# Patient Record
Sex: MALE | Race: WHITE | Employment: OTHER | URBAN - METROPOLITAN AREA
[De-identification: names, ages, dates, MRNs, and addresses within clinical notes are randomized per-mention and may not be internally consistent; named-entity substitution may affect disease eponyms.]

---

## 2017-03-14 PROBLEM — R73.9 HYPERGLYCEMIA: Status: ACTIVE | Noted: 2017-03-14

## 2018-04-04 PROCEDURE — 86803 HEPATITIS C AB TEST: CPT | Performed by: INTERNAL MEDICINE

## 2018-04-04 PROCEDURE — 82746 ASSAY OF FOLIC ACID SERUM: CPT | Performed by: INTERNAL MEDICINE

## 2018-04-04 PROCEDURE — 82607 VITAMIN B-12: CPT | Performed by: INTERNAL MEDICINE

## 2018-05-02 PROCEDURE — 87186 SC STD MICRODIL/AGAR DIL: CPT | Performed by: INTERNAL MEDICINE

## 2018-05-02 PROCEDURE — 87070 CULTURE OTHR SPECIMN AEROBIC: CPT | Performed by: INTERNAL MEDICINE

## 2018-05-02 PROCEDURE — 87077 CULTURE AEROBIC IDENTIFY: CPT | Performed by: INTERNAL MEDICINE

## 2018-05-02 PROCEDURE — 87205 SMEAR GRAM STAIN: CPT | Performed by: INTERNAL MEDICINE

## 2018-05-24 PROCEDURE — 82607 VITAMIN B-12: CPT | Performed by: INTERNAL MEDICINE

## 2019-03-14 PROBLEM — N18.30 CKD (CHRONIC KIDNEY DISEASE) STAGE 3, GFR 30-59 ML/MIN (HCC): Status: ACTIVE | Noted: 2019-03-14

## 2019-11-07 PROBLEM — N18.30 CKD (CHRONIC KIDNEY DISEASE) STAGE 3, GFR 30-59 ML/MIN (HCC): Status: RESOLVED | Noted: 2019-03-14 | Resolved: 2019-11-07

## 2020-12-03 PROBLEM — N18.30 CKD (CHRONIC KIDNEY DISEASE) STAGE 3, GFR 30-59 ML/MIN (HCC): Status: RESOLVED | Noted: 2019-03-14 | Resolved: 2020-12-03

## 2020-12-03 PROBLEM — N18.31 STAGE 3A CHRONIC KIDNEY DISEASE (HCC): Status: ACTIVE | Noted: 2020-12-03

## 2024-08-28 NOTE — H&P (VIEW-ONLY)
Dennis Olivo is a 74 year old   male    Patient presents for complaints of pain and a bulge in the right inguinal region.  This has gotten gradually larger    Past Medical History:   Diagnosis Date   • BPH (benign prostatic hyperplasia)    • CAD (coronary artery disease) 2002   • Essential hypertension    • Hyperlipidemia    • Hypothyroidism (acquired)    • ILD (interstitial lung disease) (HCC)      Past Surgical History:   Procedure Laterality Date   • CATARACT     • OTHER SURGICAL HISTORY  1994    post anal abscess   • REMV CATARACT EXTRACAP,INSERT LENS  12/7/2011    Performed by LISA MCKEON at [facility]   • REMV CATARACT EXTRACAP,INSERT LENS  12/21/2011    Performed by LISA MCKEON at [facility]     Family History   Problem Relation Age of Onset   • Heart Disorder Father    • Heart Disorder Mother    • Lipids Sister    • Lipids Brother    • Lipids Brother    • Lipids Brother    • Lipids Brother    • Lipids Brother    • Lipids Sister    • Lipids Sister    • Lipids Sister      Social History    Tobacco Use      Smoking status: Never      Smokeless tobacco: Never    Vaping Use      Vaping status: Never Used    Alcohol use: Not Currently      Alcohol/week: 0.0 standard drinks of alcohol      Comment: rare    Drug use: No        ROS:  GENERAL HEALTH: otherwise feels well, no weight loss, no fever or chills  SKIN: denies any unusual skin rashes or jaundice  HEENT: denies nasal congestion, sinus pain or sore throat; hearing loss negative  RESPIRATORY: denies shortness of breath, wheezing or cough   CARDIOVASCULAR: denies chest pain or CANTOR; no palpitations   GI: denies nausea, vomiting, constipation, diarrhea; no rectal bleeding; no heartburn  GENITAL/: no dysuria, urgency or frequency, no tea colored urine  MUSCULOSKELETAL: no joint complaints upper or lower extremities  HEMATOLOGY: denies hx anemia; denies bruising or excessive bleeding    EXAM:  GENERAL: well developed, well  nourished male, in no apparent distress  SKIN: anicteric  EYES: PERRLA, EOMI, sclera anicteric  HEENT: normocephalic; normal nose  NECK: supple, no JVD  RESPIRATORY: clear to percussion and auscultation  CARDIOVASCULAR: RRR, murmur negative  ABDOMEN: normal active BS, soft, nondistended; no HSM, no masses. There is a reducible hernia present in the right  inguinal region. The testes are descended.  LYMPHATIC: no lymphadenopathy  EXTREMITIES: no cyanosis, clubbing or edema          Imp:  right inguinal hernia    Rec:  Robotic assisted right inguinal hernia repair with mesh    The risks, benefits, and alternatives were discussed with the patient at length.   We discussed the expectations after surgery including activity restrictions, weight limitations. All of the patients questions were answered    The patient was given the booklet about hernia surgery      Thanks for referring the patient.

## 2024-09-09 RX ORDER — HYDROCODONE BITARTRATE AND HOMATROPINE METHYLBROMIDE 5; 1.5 MG/1; MG/1
1 TABLET ORAL 4 TIMES DAILY PRN
COMMUNITY

## 2024-09-09 RX ORDER — NINTEDANIB 150 MG/1
1 CAPSULE ORAL DAILY
COMMUNITY

## 2024-09-09 RX ORDER — HEPARIN SODIUM 5000 [USP'U]/ML
5000 INJECTION, SOLUTION INTRAVENOUS; SUBCUTANEOUS ONCE
Status: CANCELLED | OUTPATIENT
Start: 2024-09-09 | End: 2024-09-09

## 2024-09-09 RX ORDER — MULTIVIT-MIN/IRON FUM/FOLIC AC 7.5 MG-4
1 TABLET ORAL DAILY
COMMUNITY

## 2024-09-23 ENCOUNTER — ANESTHESIA EVENT (OUTPATIENT)
Dept: SURGERY | Facility: HOSPITAL | Age: 74
End: 2024-09-23
Payer: MEDICARE

## 2024-09-24 ENCOUNTER — ANESTHESIA (OUTPATIENT)
Dept: SURGERY | Facility: HOSPITAL | Age: 74
End: 2024-09-24
Payer: MEDICARE

## 2024-09-24 ENCOUNTER — HOSPITAL ENCOUNTER (OUTPATIENT)
Facility: HOSPITAL | Age: 74
Setting detail: HOSPITAL OUTPATIENT SURGERY
Discharge: HOME | End: 2024-09-24
Attending: SURGERY | Admitting: SURGERY
Payer: MEDICARE

## 2024-09-24 VITALS
SYSTOLIC BLOOD PRESSURE: 126 MMHG | DIASTOLIC BLOOD PRESSURE: 64 MMHG | RESPIRATION RATE: 20 BRPM | HEART RATE: 72 BPM | BODY MASS INDEX: 23.7 KG/M2 | WEIGHT: 160 LBS | HEIGHT: 69 IN | TEMPERATURE: 97 F | OXYGEN SATURATION: 94 %

## 2024-09-24 DIAGNOSIS — K40.90 RIGHT INGUINAL HERNIA: Primary | ICD-10-CM

## 2024-09-24 PROCEDURE — 8E0W4CZ ROBOTIC ASSISTED PROCEDURE OF TRUNK REGION, PERCUTANEOUS ENDOSCOPIC APPROACH: ICD-10-PCS | Performed by: SURGERY

## 2024-09-24 PROCEDURE — 0YU54JZ SUPPLEMENT RIGHT INGUINAL REGION WITH SYNTHETIC SUBSTITUTE, PERCUTANEOUS ENDOSCOPIC APPROACH: ICD-10-PCS | Performed by: SURGERY

## 2024-09-24 DEVICE — LAPAROSCOPIC SELF-FIXATING MESH POLYESTER WITH POLYLACTIC ACID GRIPS AND COLLAGEN FILM
Type: IMPLANTABLE DEVICE | Site: GROIN | Status: FUNCTIONAL
Brand: PROGRIP

## 2024-09-24 RX ORDER — NALOXONE HYDROCHLORIDE 0.4 MG/ML
0.08 INJECTION, SOLUTION INTRAMUSCULAR; INTRAVENOUS; SUBCUTANEOUS AS NEEDED
Status: DISCONTINUED | OUTPATIENT
Start: 2024-09-24 | End: 2024-09-24

## 2024-09-24 RX ORDER — KETOROLAC TROMETHAMINE 30 MG/ML
INJECTION, SOLUTION INTRAMUSCULAR; INTRAVENOUS AS NEEDED
Status: DISCONTINUED | OUTPATIENT
Start: 2024-09-24 | End: 2024-09-24 | Stop reason: SURG

## 2024-09-24 RX ORDER — LIDOCAINE HYDROCHLORIDE 10 MG/ML
INJECTION, SOLUTION EPIDURAL; INFILTRATION; INTRACAUDAL; PERINEURAL AS NEEDED
Status: DISCONTINUED | OUTPATIENT
Start: 2024-09-24 | End: 2024-09-24 | Stop reason: SURG

## 2024-09-24 RX ORDER — HYDROCODONE BITARTRATE AND ACETAMINOPHEN 5; 325 MG/1; MG/1
1 TABLET ORAL EVERY 4 HOURS PRN
Qty: 20 TABLET | Refills: 0 | Status: SHIPPED | OUTPATIENT
Start: 2024-09-24

## 2024-09-24 RX ORDER — ACETAMINOPHEN 500 MG
1000 TABLET ORAL ONCE
Status: DISCONTINUED | OUTPATIENT
Start: 2024-09-24 | End: 2024-09-24 | Stop reason: HOSPADM

## 2024-09-24 RX ORDER — SODIUM CHLORIDE, SODIUM LACTATE, POTASSIUM CHLORIDE, CALCIUM CHLORIDE 600; 310; 30; 20 MG/100ML; MG/100ML; MG/100ML; MG/100ML
INJECTION, SOLUTION INTRAVENOUS CONTINUOUS
Status: DISCONTINUED | OUTPATIENT
Start: 2024-09-24 | End: 2024-09-24

## 2024-09-24 RX ORDER — LIDOCAINE HYDROCHLORIDE ANHYDROUS AND DEXTROSE MONOHYDRATE .8; 5 G/100ML; G/100ML
INJECTION, SOLUTION INTRAVENOUS CONTINUOUS PRN
Status: DISCONTINUED | OUTPATIENT
Start: 2024-09-24 | End: 2024-09-24 | Stop reason: SURG

## 2024-09-24 RX ORDER — METOCLOPRAMIDE HYDROCHLORIDE 5 MG/ML
10 INJECTION INTRAMUSCULAR; INTRAVENOUS EVERY 8 HOURS PRN
Status: DISCONTINUED | OUTPATIENT
Start: 2024-09-24 | End: 2024-09-24

## 2024-09-24 RX ORDER — LIDOCAINE HYDROCHLORIDE AND EPINEPHRINE 10; 10 MG/ML; UG/ML
INJECTION, SOLUTION INFILTRATION; PERINEURAL AS NEEDED
Status: DISCONTINUED | OUTPATIENT
Start: 2024-09-24 | End: 2024-09-24 | Stop reason: HOSPADM

## 2024-09-24 RX ORDER — LIDOCAINE HYDROCHLORIDE 40 MG/ML
SOLUTION TOPICAL AS NEEDED
Status: DISCONTINUED | OUTPATIENT
Start: 2024-09-24 | End: 2024-09-24 | Stop reason: SURG

## 2024-09-24 RX ORDER — ONDANSETRON 2 MG/ML
4 INJECTION INTRAMUSCULAR; INTRAVENOUS EVERY 6 HOURS PRN
Status: DISCONTINUED | OUTPATIENT
Start: 2024-09-24 | End: 2024-09-24

## 2024-09-24 RX ORDER — PHENYLEPHRINE HCL 10 MG/ML
VIAL (ML) INJECTION AS NEEDED
Status: DISCONTINUED | OUTPATIENT
Start: 2024-09-24 | End: 2024-09-24 | Stop reason: SURG

## 2024-09-24 RX ORDER — ONDANSETRON 2 MG/ML
INJECTION INTRAMUSCULAR; INTRAVENOUS AS NEEDED
Status: DISCONTINUED | OUTPATIENT
Start: 2024-09-24 | End: 2024-09-24 | Stop reason: SURG

## 2024-09-24 RX ORDER — HYDROMORPHONE HYDROCHLORIDE 1 MG/ML
0.2 INJECTION, SOLUTION INTRAMUSCULAR; INTRAVENOUS; SUBCUTANEOUS EVERY 5 MIN PRN
Status: DISCONTINUED | OUTPATIENT
Start: 2024-09-24 | End: 2024-09-24

## 2024-09-24 RX ORDER — HYDROMORPHONE HYDROCHLORIDE 1 MG/ML
0.6 INJECTION, SOLUTION INTRAMUSCULAR; INTRAVENOUS; SUBCUTANEOUS EVERY 5 MIN PRN
Status: DISCONTINUED | OUTPATIENT
Start: 2024-09-24 | End: 2024-09-24

## 2024-09-24 RX ORDER — ACETAMINOPHEN 500 MG
1000 TABLET ORAL ONCE AS NEEDED
Status: DISCONTINUED | OUTPATIENT
Start: 2024-09-24 | End: 2024-09-24

## 2024-09-24 RX ORDER — HYDROCODONE BITARTRATE AND ACETAMINOPHEN 5; 325 MG/1; MG/1
1 TABLET ORAL ONCE AS NEEDED
Status: DISCONTINUED | OUTPATIENT
Start: 2024-09-24 | End: 2024-09-24

## 2024-09-24 RX ORDER — HYDROMORPHONE HYDROCHLORIDE 1 MG/ML
0.4 INJECTION, SOLUTION INTRAMUSCULAR; INTRAVENOUS; SUBCUTANEOUS EVERY 5 MIN PRN
Status: DISCONTINUED | OUTPATIENT
Start: 2024-09-24 | End: 2024-09-24

## 2024-09-24 RX ORDER — BUPIVACAINE HYDROCHLORIDE 5 MG/ML
INJECTION, SOLUTION EPIDURAL; INTRACAUDAL AS NEEDED
Status: DISCONTINUED | OUTPATIENT
Start: 2024-09-24 | End: 2024-09-24 | Stop reason: HOSPADM

## 2024-09-24 RX ORDER — DEXAMETHASONE SODIUM PHOSPHATE 4 MG/ML
VIAL (ML) INJECTION AS NEEDED
Status: DISCONTINUED | OUTPATIENT
Start: 2024-09-24 | End: 2024-09-24 | Stop reason: SURG

## 2024-09-24 RX ORDER — EPHEDRINE SULFATE 50 MG/ML
INJECTION INTRAVENOUS AS NEEDED
Status: DISCONTINUED | OUTPATIENT
Start: 2024-09-24 | End: 2024-09-24 | Stop reason: SURG

## 2024-09-24 RX ORDER — HYDROCODONE BITARTRATE AND ACETAMINOPHEN 5; 325 MG/1; MG/1
2 TABLET ORAL ONCE AS NEEDED
Status: DISCONTINUED | OUTPATIENT
Start: 2024-09-24 | End: 2024-09-24

## 2024-09-24 RX ORDER — ROCURONIUM BROMIDE 10 MG/ML
INJECTION, SOLUTION INTRAVENOUS AS NEEDED
Status: DISCONTINUED | OUTPATIENT
Start: 2024-09-24 | End: 2024-09-24 | Stop reason: SURG

## 2024-09-24 RX ADMIN — EPHEDRINE SULFATE 5 MG: 50 INJECTION INTRAVENOUS at 07:54:00

## 2024-09-24 RX ADMIN — KETOROLAC TROMETHAMINE 15 MG: 30 INJECTION, SOLUTION INTRAMUSCULAR; INTRAVENOUS at 08:16:00

## 2024-09-24 RX ADMIN — DEXAMETHASONE SODIUM PHOSPHATE 4 MG: 4 MG/ML VIAL (ML) INJECTION at 07:48:00

## 2024-09-24 RX ADMIN — LIDOCAINE HYDROCHLORIDE 50 MG: 10 INJECTION, SOLUTION EPIDURAL; INFILTRATION; INTRACAUDAL; PERINEURAL at 08:03:00

## 2024-09-24 RX ADMIN — ROCURONIUM BROMIDE 50 MG: 10 INJECTION, SOLUTION INTRAVENOUS at 07:40:00

## 2024-09-24 RX ADMIN — SODIUM CHLORIDE, SODIUM LACTATE, POTASSIUM CHLORIDE, CALCIUM CHLORIDE: 600; 310; 30; 20 INJECTION, SOLUTION INTRAVENOUS at 07:35:00

## 2024-09-24 RX ADMIN — ROCURONIUM BROMIDE 20 MG: 10 INJECTION, SOLUTION INTRAVENOUS at 07:52:00

## 2024-09-24 RX ADMIN — EPHEDRINE SULFATE 10 MG: 50 INJECTION INTRAVENOUS at 07:58:00

## 2024-09-24 RX ADMIN — LIDOCAINE HYDROCHLORIDE 50 MG: 10 INJECTION, SOLUTION EPIDURAL; INFILTRATION; INTRACAUDAL; PERINEURAL at 07:40:00

## 2024-09-24 RX ADMIN — PHENYLEPHRINE HCL 50 MCG: 10 MG/ML VIAL (ML) INJECTION at 08:00:00

## 2024-09-24 RX ADMIN — LIDOCAINE HYDROCHLORIDE 4 ML: 40 SOLUTION TOPICAL at 07:40:00

## 2024-09-24 RX ADMIN — ONDANSETRON 4 MG: 2 INJECTION INTRAMUSCULAR; INTRAVENOUS at 08:16:00

## 2024-09-24 RX ADMIN — LIDOCAINE HYDROCHLORIDE ANHYDROUS AND DEXTROSE MONOHYDRATE 2 MG/KG/HR: .8; 5 INJECTION, SOLUTION INTRAVENOUS at 07:51:00

## 2024-09-24 NOTE — DISCHARGE INSTRUCTIONS
Dr. Lloyd Discharge Instructions  Hernia Repair    Activity    Activity can be resumed as tolerated, including walking, stairs, climbing, light exercise, and driving short distances, though you should not drive if you are still taking pain medication. Heavy lifting (over 10 lbs.) is to be avoided for four to six weeks.  Keep legs elevated when sitting, rather than dangling. You can return to work at your discretion, but about one week off is recommended.    Diet    Your diet should consist primarily of liquids until you have a good appetite,usually within the first 48 hours after surgery. A regular diet may then be resumed.    Medications    Incisional pain is commonly of moderate intensity in the first 24-48 hours after surgery and gradually improves over the initial post-operative week.  Prescription pain medication can make you nauseated, light-headed, and constipated. Take it with food and discontinue if side effects occur.  No alcohol or driving while taking prescription medications. Use milk of magnesia for constipation.    Dressings    Ice pack to operative site for 48 hours. Do not remove the clear bandage (Tegaderm) over the incision until seen by the doctor. You may shower in 24 hours. Do not immerse the wound under water in the bath or whirlpool until seen in the doctor's office. The \"butterfly\" type bandage or steri-strips should remain in place. The incision normally becomes somewhat hard and may form a lump as part of the normal healing process. This is not a recurrence of the hernia. The area around the incision and scrotum may also be bruised. Any progressive redness, pain, or drainage should be reported after the first two days.    Follow-up    If you have any questions or problems, such as fever, persistent nausea, bowel problems, uncontrolled pain or poor appetite, you should contact the office.    Contact the office for a follow-up appointment to be seen in one to two week after surgery. Office  phone: {[phone]    You received a drug called Toradol which is an Anti Inflammatory at: 8:15am.  If you are allowed to take Anti inflammatories:    Do not take any Anti Inflammatory like Motrin, Aleve or Ibuprophen until after: 2:15pm.  Please report any suspected allergic reactions or bleeding issues to your doctor

## 2024-09-24 NOTE — BRIEF OP NOTE
Pre-Operative Diagnosis: RIGHT INGUINAL HERNIA     Post-Operative Diagnosis: RIGHT INGUINAL HERNIA      Procedure Performed:   ROBOTIC RIGHT INGUINAL HERNIA REPAIR WITH MESH    Surgeons and Role:     * Scott Lloyd MD - Primary    Assistant(s):  PA: Sharon Vale PA     Surgical Findings: indirect right inguinal hernia        Estimated Blood Loss: Blood Output: 3 mL (9/24/2024  8:25 AM)          Scott Lloyd MD  9/24/2024  8:30 AM

## 2024-09-24 NOTE — ANESTHESIA PREPROCEDURE EVALUATION
PRE-OP EVALUATION    Patient Name: Dennis Olivo    Admit Diagnosis: RIGHT INGUINAL HERNIA    Pre-op Diagnosis: RIGHT INGUINAL HERNIA    ROBOTIC RIGHT INGUINAL HERNIA REPAIR WITH MESH, POSSIBLE OPEN    Anesthesia Procedure: ROBOTIC RIGHT INGUINAL HERNIA REPAIR WITH MESH, POSSIBLE OPEN (Right: Abdomen)    Surgeons and Role:     * Scott Lloyd MD - Primary    Pre-op vitals reviewed.  Temp: 98.1 °F (36.7 °C)  Pulse: 82  Resp: 16  BP: 140/76  SpO2: 94 %  Body mass index is 23.63 kg/m².    Current medications reviewed.  Hospital Medications:   acetaminophen (Tylenol Extra Strength) tab 1,000 mg  1,000 mg Oral Once    lactated ringers infusion   Intravenous Continuous    ceFAZolin (Ancef) 2g in 10mL IV syringe premix  2 g Intravenous Once    lactated ringers infusion   Intravenous Continuous    lactated ringers IV bolus 500 mL  500 mL Intravenous Once PRN    atropine 0.1 MG/ML injection 0.5 mg  0.5 mg Intravenous PRN    naloxone (Narcan) 0.4 MG/ML injection 0.08 mg  0.08 mg Intravenous PRN    fentaNYL (Sublimaze) 50 mcg/mL injection 25 mcg  25 mcg Intravenous Q5 Min PRN    Or    fentaNYL (Sublimaze) 50 mcg/mL injection 50 mcg  50 mcg Intravenous Q5 Min PRN    HYDROmorphone (Dilaudid) 1 MG/ML injection 0.2 mg  0.2 mg Intravenous Q5 Min PRN    Or    HYDROmorphone (Dilaudid) 1 MG/ML injection 0.4 mg  0.4 mg Intravenous Q5 Min PRN    Or    HYDROmorphone (Dilaudid) 1 MG/ML injection 0.6 mg  0.6 mg Intravenous Q5 Min PRN    acetaminophen (Tylenol Extra Strength) tab 1,000 mg  1,000 mg Oral Once PRN    Or    HYDROcodone-acetaminophen (Norco) 5-325 MG per tab 1 tablet  1 tablet Oral Once PRN    Or    HYDROcodone-acetaminophen (Norco) 5-325 MG per tab 2 tablet  2 tablet Oral Once PRN    ondansetron (Zofran) 4 MG/2ML injection 4 mg  4 mg Intravenous Q6H PRN    metoclopramide (Reglan) 5 mg/mL injection 10 mg  10 mg Intravenous Q8H PRN       Outpatient Medications:     Medications Prior to Admission   Medication Sig Dispense  Refill Last Dose    Nintedanib Esylate (OFEV) 150 MG Oral Cap Take 1 tablet by mouth daily.   Past Month    Multiple Vitamins-Minerals (MULTI-VITAMIN/MINERALS) Oral Tab Take 1 tablet by mouth daily.   Past Week    HYDROcodone Bit-Homatrop MBr 5-1.5 MG Oral Tab Take 1 tablet by mouth 4 (four) times daily as needed (cough).   9/17/2024    LEVOTHYROXINE 75 MCG Oral Tab TAKE 1 TABLET(75 MCG) BY MOUTH BEFORE BREAKFAST 90 tablet 0 9/24/2024 at 0330    ATORVASTATIN 20 MG Oral Tab TAKE 1 TABLET(20 MG) BY MOUTH DAILY 90 tablet 1 9/23/2024    METOPROLOL SUCCINATE 50 MG Oral Tablet 24 Hr TAKE 1 TABLET(50 MG) BY MOUTH DAILY 90 tablet 1 9/24/2024 at 0330    tamsulosin (FLOMAX) cap Take 2 capsules (0.8 mg total) by mouth daily. 180 capsule 3 9/23/2024    Terbinafine HCl (LAMISIL AT) 1 % External Cream Apply 1 Application topically daily. 12 g 0     Aspirin (SB LOW DOSE ASA EC) 81 MG Oral Tab EC 1 TABLET DAILY   9/17/2024    Fish Oil Does not apply Oil Take 1 tablet by mouth daily.   Past Month       Allergies: Dander      Anesthesia Evaluation        Anesthetic Complications           GI/Hepatic/Renal    Negative GI/hepatic/renal ROS.                             Cardiovascular                  (+) hypertension     (+) CAD                                Endo/Other    Negative endo/other ROS.                              Pulmonary    Negative pulmonary ROS.                       Neuro/Psych    Negative neuro/psych ROS.                          Interstitial lung disease    2/2022 ECHO findings:  Summary:     1. Left ventricle: The cavity size is normal. Wall thickness is normal.      Systolic function is normal. The estimated ejection fraction is 60-65%.      Wall motion is normal; there are no regional wall motion abnormalities.      Left ventricular diastolic function parameters are normal for the      patient's age.   2. Aortic valve: There is mild regurgitation. Peak velocity (S): 1.3m/sec.   3. Right ventricle: Estimation of  the right ventricular systolic pressure is      within the normal range. The estimated peak pressure is 25mm Hg.   4. Tricuspid valve: There is mild regurgitation.   5. Pericardium, extracardiac: There is no significant pericardial effusion.     Impressions:     - Normal cardiac chamber sizes.   - Normal left ventricular systolic and diastolic function.   - Mild aortic insufficiency, without stenosis.   - No previous study was available for comparison.           Past Surgical History:   Procedure Laterality Date    Cataract      Other surgical history  1994    post anal abscess    Remv cataract extracap,insert lens  12/7/2011    Performed by LISA [facility]ON at [facility]    Remv cataract extracap,insert lens  12/21/2011    Performed by LISA MCKEON at [facility]     Social History     Socioeconomic History    Marital status:    Tobacco Use    Smoking status: Never    Smokeless tobacco: Never   Vaping Use    Vaping status: Never Used   Substance and Sexual Activity    Alcohol use: Yes     Alcohol/week: 0.0 standard drinks of alcohol     Comment: rarily    Drug use: No     History   Drug Use No     Available pre-op labs reviewed.               Airway      Mallampati: II  Mouth opening: >3 FB  TM distance: 4 - 6 cm  Neck ROM: full Cardiovascular             Dental             Pulmonary                     Other findings              ASA: 3   Plan: general  NPO status verified and patient meets guidelines.          Plan/risks discussed with: patient                Present on Admission:  **None**

## 2024-09-24 NOTE — ANESTHESIA POSTPROCEDURE EVALUATION
[facility]    Dennis Olivo Patient Status:  Hospital Outpatient Surgery   Age/Gender 74 year old male MRN [id number]   Location [facility] POST ANESTHESIA CARE UNIT Attending Scott Lloyd MD   Hosp Day # 0 PCP Luiz Brown MD       Anesthesia Post-op Note    ROBOTIC RIGHT INGUINAL HERNIA REPAIR WITH MESH    Procedure Summary       Date: 09/24/24 Room / Location:  MAIN OR 09 / [facility] MAIN OR    Anesthesia Start: 0735 Anesthesia Stop: 0840    Procedure: ROBOTIC RIGHT INGUINAL HERNIA REPAIR WITH MESH (Right: Abdomen) Diagnosis: (RIGHT INGUINAL HERNIA)    Surgeons: Scott Lloyd MD Anesthesiologist: Estuardo Silver MD    Anesthesia Type: general ASA Status: 3            Anesthesia Type: general    Vitals Value Taken Time   /54 09/24/24 0841   Temp 99.1 09/24/24 0843   Pulse 75 09/24/24 0843   Resp 21 09/24/24 0843   SpO2 100 % 09/24/24 0843   Vitals shown include unfiled device data.    Patient Location: PACU    Anesthesia Type: general    Airway Patency: patent    Postop Pain Control: adequate    Mental Status: preanesthetic baseline    Nausea/Vomiting: none    Cardiopulmonary/Hydration status: stable euvolemic    Complications: no apparent anesthesia related complications    Postop vital signs: stable    Dental Exam: Unchanged from Preop    Patient to be discharged from PACU when criteria met.

## 2024-09-24 NOTE — INTERVAL H&P NOTE
Pre-op Diagnosis: RIGHT INGUINAL HERNIA    The above referenced H&P was reviewed by Scott Lloyd MD on 9/24/2024, the patient was examined and no significant changes have occurred in the patient's condition since the H&P was performed.  I discussed with the patient and/or legal representative the potential benefits, risks and side effects of this procedure; the likelihood of the patient achieving goals; and potential problems that might occur during recuperation.  I discussed reasonable alternatives to the procedure, including risks, benefits and side effects related to the alternatives and risks related to not receiving this procedure.  We will proceed with procedure as planned.

## 2024-09-24 NOTE — OPERATIVE REPORT
[facility]    PATIENT'S NAME: CITLALY HONEYCUTT   ATTENDING PHYSICIAN: Scott Lloyd M.D.   OPERATING PHYSICIAN: Scott Lloyd M.D.   PATIENT ACCOUNT#:   [id number]    LOCATION:  [facility] 10  MEDICAL RECORD #:   [id number]       YOB: 1950  ADMISSION DATE:       09/24/2024      OPERATION DATE:  09/24/2024    OPERATIVE REPORT      PREOPERATIVE DIAGNOSIS:  Right inguinal hernia.  POSTOPERATIVE DIAGNOSIS:  Indirect right inguinal hernia.  PROCEDURE:  Robotic-assisted repair of right inguinal hernia with mesh.    ASSISTANT:  Sharon Vale PA-C.  She assisted by prepping, draping, camera holding, trocar inserting, passing suture and mesh throughout the robotic procedure.    ANESTHESIA:  General.     OPERATIVE TECHNIQUE:  The patient was brought in the operating room and placed on the operating table in supine position.  Inhalational anesthesia provided by the attending anesthesiologist.  The abdomen was prepped in usual sterile fashion.  Lidocaine 1% and Marcaine 0.5% was used as a local anesthetic.  I made a skin incision just above the umbilicus, inserted a Veress needle, established a pneumoperitoneum, placed an 8 mm trocar.  We inserted the camera.  I placed an 8 mm trocar on the right, my assistant placed 1 on the left, both under direct robotic guidance.  We docked the robot, inserted our instruments, and I went to the robotic console.  At the robotic console, I inspected the pelvic floor.  The patient had a moderately large indirect hernia on the right.  I divided the overlying peritoneum.  I completely reduced the indirect hernia sac and exposed the entire right inguinal floor.  My assistant passed me the ProGrip mesh.  I centered it over the hernia defect and pressed it into position the mesh covered the entire inguinal floor.  After the mesh was in good position, I checked for hemostasis which was good.  She passed me the V-Loc suture and I reapproximated  peritoneum and then she retrieved the needle.  At this time, the robotic portion of procedure was complete.  The trocars removed.  The pneumoperitoneum released.  The wounds were closed with absorbable suture.  Steri-Strips and sterile dressing were provided.  The patient tolerated the procedure well.  He was taken to the recovery room for observation.    Dictated By Scott Lloyd M.D.  d: 09/24/2024 08:33:06  t: 09/24/2024 14:13:06  [facility] [id number]  [facility]/

## 2024-09-24 NOTE — ANESTHESIA PROCEDURE NOTES
Airway  Date/Time: 9/24/2024 7:41 AM  Urgency: elective      General Information and Staff    Patient location during procedure: OR  Anesthesiologist: Estuardo Silver MD  Resident/CRNA: Magui Francis CRNA  Performed: CRNA   Performed by: Magui Francis CRNA  Authorized by: Estuardo Silver MD      Indications and Patient Condition  Indications for airway management: anesthesia  Sedation level: deep  Preoxygenated: yes  Patient position: sniffing      Final Airway Details  Final airway type: endotracheal airway      Successful airway: ETT  Cuffed: yes   Successful intubation technique: Video laryngoscopy  Facilitating devices/methods: intubating stylet  Endotracheal tube insertion site: oral  Blade: GlideScope  Blade size: #4  ETT size (mm): 7.5    Cormack-Lehane Classification: grade I - full view of glottis  Placement verified by: capnometry   Measured from: lips  ETT to lips (cm): 22  Number of attempts at approach: 1  Number of other approaches attempted: 0

## (undated) DEVICE — MONOPOLAR CURVED SCISSORS: Brand: ENDOWRIST

## (undated) DEVICE — COVER,LIGHT,CAMERA,HARD,1/PK,STRL: Brand: MEDLINE

## (undated) DEVICE — FENESTRATED BIPOLAR FORCEPS: Brand: ENDOWRIST

## (undated) DEVICE — ROBOTIC GENERAL: Brand: MEDLINE INDUSTRIES, INC.

## (undated) DEVICE — TIP COVER ACCESSORY

## (undated) DEVICE — 450 ML BOTTLE OF 0.05% CHLORHEXIDINE GLUCONATE IN 99.95% STERILE WATER FOR IRRIGATION, USP AND APPLICATOR.: Brand: IRRISEPT ANTIMICROBIAL WOUND LAVAGE

## (undated) DEVICE — ABSORBABLE WOUND CLOSURE DEVICE: Brand: V-LOC 90

## (undated) DEVICE — ARM DRAPE

## (undated) DEVICE — COLUMN DRAPE

## (undated) DEVICE — SKN PREP SPNG STKS PVP PNT STR: Brand: MEDLINE INDUSTRIES, INC.

## (undated) DEVICE — UNDYED BRAIDED (POLYGLACTIN 910), SYNTHETIC ABSORBABLE SUTURE: Brand: COATED VICRYL

## (undated) DEVICE — DRAPE,TOP,102X53,STERILE: Brand: MEDLINE

## (undated) DEVICE — GAUZE,SPONGE,4"X4",12PLY,STERILE,LF,2'S: Brand: MEDLINE

## (undated) DEVICE — SURG GL, SENSICARE PI ORTHO LT,LF,PF,8.5: Brand: MEDLINE

## (undated) DEVICE — 3M™ TEGADERM™ TRANSPARENT FILM DRESSING FRAME STYLE, 1624W, 2-3/8 IN X 2-3/4 IN (6 CM X 7 CM), 100/CT 4CT/CASE: Brand: 3M™ TEGADERM™

## (undated) DEVICE — MEGA SUTURECUT ND: Brand: ENDOWRIST

## (undated) DEVICE — CANNULA SEAL

## (undated) DEVICE — BLADELESS OBTURATOR: Brand: WECK VISTA

## (undated) DEVICE — LAPAROVUE VISIBILITY SYSTEM LAPAROSCOPIC SOLUTIONS: Brand: LAPAROVUE

## (undated) DEVICE — ENDOPATH ULTRA VERESS INSUFFLATION NEEDLES WITH LUER LOCK CONNECTORS: Brand: ENDOPATH

## (undated) DEVICE — 40580 - THE PINK PAD - ADVANCED TRENDELENBURG POSITIONING KIT: Brand: 40580 - THE PINK PAD - ADVANCED TRENDELENBURG POSITIONING KIT

## (undated) DEVICE — STERILE DRAPE FOR USE WITH SITUATE ROOM SCANNER: Brand: SITUATE